# Patient Record
Sex: MALE | Race: OTHER | URBAN - METROPOLITAN AREA
[De-identification: names, ages, dates, MRNs, and addresses within clinical notes are randomized per-mention and may not be internally consistent; named-entity substitution may affect disease eponyms.]

---

## 2017-02-21 ENCOUNTER — OFFICE VISIT (OUTPATIENT)
Dept: FAMILY MEDICINE CLINIC | Age: 42
End: 2017-02-21

## 2017-02-21 VITALS
TEMPERATURE: 97.9 F | SYSTOLIC BLOOD PRESSURE: 136 MMHG | WEIGHT: 168.4 LBS | HEIGHT: 65 IN | HEART RATE: 57 BPM | DIASTOLIC BLOOD PRESSURE: 88 MMHG | BODY MASS INDEX: 28.06 KG/M2

## 2017-02-21 DIAGNOSIS — I10 ESSENTIAL HYPERTENSION, BENIGN: ICD-10-CM

## 2017-02-21 DIAGNOSIS — E89.0 HYPOTHYROIDISM, POSTRADIOIODINE THERAPY: Primary | ICD-10-CM

## 2017-02-21 DIAGNOSIS — E78.00 PURE HYPERCHOLESTEROLEMIA: ICD-10-CM

## 2017-02-21 RX ORDER — LISINOPRIL 5 MG/1
5 TABLET ORAL DAILY
Qty: 90 TAB | Refills: 0 | Status: SHIPPED | OUTPATIENT
Start: 2017-02-21 | End: 2017-04-17 | Stop reason: SDUPTHER

## 2017-02-21 RX ORDER — LEVOTHYROXINE SODIUM 200 UG/1
200 TABLET ORAL
Qty: 90 TAB | Refills: 0 | Status: SHIPPED | OUTPATIENT
Start: 2017-02-21 | End: 2017-04-17 | Stop reason: SDUPTHER

## 2017-02-21 RX ORDER — LOVASTATIN 10 MG/1
10 TABLET ORAL
Qty: 90 TAB | Refills: 0 | Status: SHIPPED | OUTPATIENT
Start: 2017-02-21 | End: 2017-04-17 | Stop reason: SDUPTHER

## 2017-02-21 NOTE — PROGRESS NOTES
Coordination of Care  1. Have you been to the ER, urgent care clinic since your last visit? Hospitalized since your last visit? No    2. Have you seen or consulted any other health care providers outside of the [facility] since your last visit? Include any pap smears or colon screening. No    Medications  Medication Reconciliation Performed: no  Patient does need refills     Learning Assessment Complete?  yes

## 2017-02-21 NOTE — PROGRESS NOTES
Assessment/Plan:       ICD-10-CM ICD-9-CM    1. Hypothyroidism, postradioiodine therapy E89.0 244.1 levothyroxine (SYNTHROID) 200 mcg tablet      T4, FREE      T3 TOTAL      TSH 3RD GENERATION   2. Essential hypertension, benign I10 401.1 lisinopril (PRINIVIL, ZESTRIL) 5 mg tablet   3. Pure hypercholesterolemia E78.00 272.0 lovastatin (MEVACOR) 10 mg tablet      LIPID PANEL     Follow-up Disposition:  Return for return 6 weeks fasting labs (ordered), follow up provider 7 or more weeks. [facility]  Subjective:   Maddie Williamson is a 37 y.o. OTHER male who speaks Bolivian. Chief Complaint   Patient presents with    Medication Refill     thyroid    -mouth is bitter, especially his throat and bad breath;   -he had graves' disease - hyperthyroid    Current Outpatient Prescriptions   Medication Sig Dispense Refill    levothyroxine (SYNTHROID) 200 mcg tablet Take 1 Tab by mouth Daily (before breakfast). Para el tiroides 90 Tab 0    lisinopril (PRINIVIL, ZESTRIL) 5 mg tablet Take 1 Tab by mouth daily. elver 1 tab por la boca diaria 90 Tab 0    lovastatin (MEVACOR) 10 mg tablet Take 1 Tab by mouth nightly. elver 1 tab por la boca diaria 90 Tab 0    ibuprofen (MOTRIN) 600 mg tablet Take 1 Tab by mouth every six (6) hours as needed for Pain. [place] 1 pastilla cada 6 horas si necissita por valle dolor con comida 60 Tab 1   History of Present Illness: Thyroid. He has been out of his medications for a week. denies fatigue, weight changes, heat/cold intolerance, bowel/skin changes or CVS symptoms  Past Surgical History: He  has no past surgical history on file. Social History: He  reports that he has never smoked. He has never used smokeless tobacco. He reports that he does not drink alcohol or use illicit drugs.   Objective:     Vitals:    02/21/17 0840   BP: 136/88   Pulse: (!) 57   Temp: 97.9 °F (36.6 °C)   TempSrc: Oral   Weight: 168 lb 6.4 oz (76.4 kg)   Height: 5' 5.35\" (1.66 m)    No LMP for male patient. Wt Readings from Last 2 Encounters:   02/21/17 168 lb 6.4 oz (76.4 kg)   12/01/16 170 lb (77.1 kg)     Lab Review:No results found for any visits on 02/21/17. Physical Examination: Neck - Supple, no adenopathy. Thyroid is normal in size without nodules or tenderness. General appearance - well developed, no acute distress. Chest - clear to auscultation. Heart - regular rate and rhythm without murmurs, rubs, or gallops. Abdomen - bowel sounds present x 4, NT, ND. Extremities - pulses intact. No peripheral edema. Assessment/Plan:   Andres Kent was seen today for medication refill. Diagnoses and all orders for this visit:    Hypothyroidism, postradioiodine therapy  -     levothyroxine (SYNTHROID) 200 mcg tablet; Take 1 Tab by mouth Daily (before breakfast). Para el tiroides  -     T4, FREE; Future  -     T3 TOTAL; Future  -     TSH 3RD GENERATION; Future    Essential hypertension, benign  -     lisinopril (PRINIVIL, ZESTRIL) 5 mg tablet; Take 1 Tab by mouth daily. elver 1 tab por la boca diaria    Pure hypercholesterolemia  -     lovastatin (MEVACOR) 10 mg tablet; Take 1 Tab by mouth nightly. elver 1 tab por la boca diaria  -     LIPID PANEL; Future      Follow-up Disposition:  Return for return 6 weeks fasting labs (ordered), follow up provider 7 or more weeks. Anna Lucero, MSN, RN, FNP-BC, BC-ADM  I sent 90 days; will have more after follow up appt  Return 6-8 weeks labs, then appt 2 mos - earliest to check would April 4 (6 weeks); plan at least 6 months of meds if all OK. Iwona Mead expressed understanding of this plan.

## 2017-04-06 ENCOUNTER — CLINICAL SUPPORT (OUTPATIENT)
Dept: FAMILY MEDICINE CLINIC | Age: 42
End: 2017-04-06

## 2017-04-06 ENCOUNTER — HOSPITAL ENCOUNTER (OUTPATIENT)
Dept: LAB | Age: 42
Discharge: HOME | End: 2017-04-06

## 2017-04-06 DIAGNOSIS — E78.00 PURE HYPERCHOLESTEROLEMIA: ICD-10-CM

## 2017-04-06 DIAGNOSIS — E89.0 HYPOTHYROIDISM, POSTRADIOIODINE THERAPY: ICD-10-CM

## 2017-04-06 DIAGNOSIS — Z13.29 SCREENING FOR THYROID DISORDER: Primary | ICD-10-CM

## 2017-04-06 LAB
CHOLEST SERPL-MCNC: 201 MG/DL
HDLC SERPL-MCNC: 75 MG/DL
HDLC SERPL: 2.7 {RATIO} (ref 0–5)
LDLC SERPL CALC-MCNC: 101.6 MG/DL (ref 0–100)
LIPID PROFILE,FLP: ABNORMAL
T4 FREE SERPL-MCNC: 1.2 NG/DL (ref 0.8–1.5)
TRIGL SERPL-MCNC: 122 MG/DL (ref ?–150)
TSH SERPL DL<=0.05 MIU/L-ACNC: 0.66 UIU/ML (ref 0.36–3.74)
VLDLC SERPL CALC-MCNC: 24.4 MG/DL

## 2017-04-06 PROCEDURE — 84439 ASSAY OF FREE THYROXINE: CPT | Performed by: NURSE PRACTITIONER

## 2017-04-06 PROCEDURE — 84480 ASSAY TRIIODOTHYRONINE (T3): CPT | Performed by: NURSE PRACTITIONER

## 2017-04-06 PROCEDURE — 80061 LIPID PANEL: CPT | Performed by: NURSE PRACTITIONER

## 2017-04-06 PROCEDURE — 84443 ASSAY THYROID STIM HORMONE: CPT | Performed by: NURSE PRACTITIONER

## 2017-04-06 NOTE — PROGRESS NOTES
Patient here for fasting labs only, labs drawn was a Lipid, Tsh,T3 Total, T4 Free, in the right arm, patient left lab with no bleeding, no pain, and feeling well. Patient was advise that a Dr or Nurse will call with the results or patient can go on my chart to see the results and then discuss at next visit. Tamica Syed, Medical Assistant.

## 2017-04-08 LAB — T3 SERPL-MCNC: 79 NG/DL (ref 71–180)

## 2017-04-17 ENCOUNTER — OFFICE VISIT (OUTPATIENT)
Dept: FAMILY MEDICINE CLINIC | Age: 42
End: 2017-04-17

## 2017-04-17 VITALS
TEMPERATURE: 98.7 F | SYSTOLIC BLOOD PRESSURE: 116 MMHG | WEIGHT: 171 LBS | BODY MASS INDEX: 28.15 KG/M2 | HEART RATE: 64 BPM | DIASTOLIC BLOOD PRESSURE: 78 MMHG

## 2017-04-17 DIAGNOSIS — E78.00 PURE HYPERCHOLESTEROLEMIA: ICD-10-CM

## 2017-04-17 DIAGNOSIS — I10 ESSENTIAL HYPERTENSION, BENIGN: ICD-10-CM

## 2017-04-17 DIAGNOSIS — E89.0 HYPOTHYROIDISM, POSTRADIOIODINE THERAPY: Primary | ICD-10-CM

## 2017-04-17 RX ORDER — LISINOPRIL 5 MG/1
5 TABLET ORAL DAILY
Qty: 90 TAB | Refills: 1 | Status: SHIPPED | OUTPATIENT
Start: 2017-04-17 | End: 2018-01-09 | Stop reason: SDUPTHER

## 2017-04-17 RX ORDER — LOVASTATIN 10 MG/1
10 TABLET ORAL
Qty: 90 TAB | Refills: 1 | Status: SHIPPED | OUTPATIENT
Start: 2017-04-17 | End: 2018-01-09 | Stop reason: SDUPTHER

## 2017-04-17 RX ORDER — LEVOTHYROXINE SODIUM 200 UG/1
200 TABLET ORAL
Qty: 90 TAB | Refills: 1 | Status: SHIPPED | OUTPATIENT
Start: 2017-04-17 | End: 2017-05-30 | Stop reason: SDUPTHER

## 2017-04-17 NOTE — PROGRESS NOTES
Assessment/Plan:       ICD-10-CM ICD-9-CM    1. Hypothyroidism, postradioiodine therapy E89.0 244.1 levothyroxine (SYNTHROID) 200 mcg tablet   2. Essential hypertension, benign I10 401.1 lisinopril (PRINIVIL, ZESTRIL) 5 mg tablet   3. Pure hypercholesterolemia E78.00 272.0 lovastatin (MEVACOR) 10 mg tablet     Follow-up Disposition:  Return in about 6 months (around 10/17/2017). [facility]  Subjective:   Jose Otero is a 37 y.o. OTHER male who speaks Icelandic. Chief Complaint   Patient presents with    Hypertension     f/u   History of Present Illness: feeling well. He had his labs done as asked. Review of Systems: Negative for: fever, chest pain, shortness of breath, leg swelling, exertional dyspnea, palpitations. Past Surgical History: He  has no past surgical history on file. Social History: He  reports that he has never smoked. He has never used smokeless tobacco. He reports that he does not drink alcohol or use illicit drugs. Objective:     Vitals:    04/17/17 1304   BP: 116/78   Pulse: 64   Temp: 98.7 °F (37.1 °C)   TempSrc: Oral   Weight: 171 lb (77.6 kg)    No LMP for male patient. Wt Readings from Last 2 Encounters:   04/17/17 171 lb (77.6 kg)   02/21/17 168 lb 6.4 oz (76.4 kg)     Hospital Outpatient Visit on 04/06/2017   Component Date Value Ref Range Status    T4, Free 04/06/2017 1.2  0.8 - 1.5 NG/DL Final    T3, total 04/06/2017 79  71 - 180 ng/dL Final    Comment: (NOTE)  Performed At: [address] [id number]  Herminio Medina MD TY:[id number]      TSH 04/06/2017 0.66  0.36 - 3.74 uIU/mL Final    Comment: (NOTE)  Due to TSH heterogeneity, both structurally and degree of   glycosylation, monoclonal antibodies used in the TSH assay may not   accurately quantitate TSH.  Therefore, this result should be   correlated with clinical findings as well as with other assessments   of thyroid function, e.g., free T4, free T3.      LIPID PROFILE 04/06/2017        Final    Cholesterol, total 04/06/2017 201* <200 MG/DL Final    Triglyceride 04/06/2017 122  <150 MG/DL Final    Based on NCEP-ATP III:  Triglycerides <150 mg/dL  is considered normal, 150-199 mg/dL  borderline high,  200-499 mg/dL high and  greater than or equal to 500 mg/dL very high.  HDL Cholesterol 04/06/2017 75  MG/DL Final    Based on NCEP ATP III, HDL Cholesterol <40 mg/dL is considered low and >60 mg/dL is elevated.  LDL, calculated 04/06/2017 101.6* 0 - 100 MG/DL Final    Comment: Based on the NCEP-ATP: LDL-C concentrations are considered  optimal <100 mg/dL,  near optimal/above Normal 100-129 mg/dL  Borderline High: 130-159, High: 160-189 mg/dL  Very High: Greater than or equal to 190 mg/dL      VLDL, calculated 04/06/2017 24.4  MG/DL Final    CHOL/HDL Ratio 04/06/2017 2.7  0 - 5.0   Final       Lab Review:No results found for any visits on 04/17/17. Physical Examination:   General appearance - well developed, no acute distress. Neck supple, no lymphadenopathy, no thyromegaly or nodules. Chest - clear to auscultation. Heart - regular rate and rhythm without murmurs, rubs, or gallops. Abdomen - bowel sounds present x 4, NT, ND. Extremities - pulses intact. No peripheral edema. Assessment/Plan:   Anant Mendez was seen today for hypertension. Diagnoses and all orders for this visit:    Hypothyroidism, postradioiodine therapy  -     levothyroxine (SYNTHROID) 200 mcg tablet; Take 1 Tab by mouth Daily (before breakfast). Para el tiroides    Essential hypertension, benign  -     lisinopril (PRINIVIL, ZESTRIL) 5 mg tablet; Take 1 Tab by mouth daily. elver 1 tab por la boca diaria    Pure hypercholesterolemia  -     lovastatin (MEVACOR) 10 mg tablet; Take 1 Tab by mouth nightly. elver 1 tab por la boca diaria      Follow-up Disposition:  Return in about 6 months (around 10/17/2017). Labs look great. Continue current medications.   Abiodun Cesar, MSN, RN, FNP-BC, BC-ADM  Patrica Ledesma expressed understanding of this plan.

## 2017-04-17 NOTE — PROGRESS NOTES
Coordination of Care  1. Have you been to the ER, urgent care clinic since your last visit? Hospitalized since your last visit? No    2. Have you seen or consulted any other health care providers outside of the [address] [place] [address] since your last visit? Include any pap smears or colon screening. No    Medications  Medication Reconciliation Performed: no  Patient does need refills     Learning Assessment Complete?  yes

## 2017-05-26 DIAGNOSIS — E89.0 HYPOTHYROIDISM, POSTRADIOIODINE THERAPY: ICD-10-CM

## 2017-05-30 RX ORDER — LEVOTHYROXINE SODIUM 200 UG/1
TABLET ORAL
Qty: 90 TAB | Refills: 0 | Status: SHIPPED | OUTPATIENT
Start: 2017-05-30 | End: 2018-01-09 | Stop reason: SDUPTHER

## 2018-01-09 ENCOUNTER — OFFICE VISIT (OUTPATIENT)
Dept: FAMILY MEDICINE CLINIC | Age: 43
End: 2018-01-09

## 2018-01-09 VITALS
OXYGEN SATURATION: 100 % | WEIGHT: 174 LBS | BODY MASS INDEX: 28.99 KG/M2 | TEMPERATURE: 97.5 F | SYSTOLIC BLOOD PRESSURE: 140 MMHG | HEIGHT: 65 IN | HEART RATE: 54 BPM | DIASTOLIC BLOOD PRESSURE: 102 MMHG

## 2018-01-09 DIAGNOSIS — E78.00 PURE HYPERCHOLESTEROLEMIA: ICD-10-CM

## 2018-01-09 DIAGNOSIS — T14.8XXA MUSCLE STRAIN: ICD-10-CM

## 2018-01-09 DIAGNOSIS — I10 ESSENTIAL HYPERTENSION, BENIGN: ICD-10-CM

## 2018-01-09 DIAGNOSIS — Z23 ENCOUNTER FOR IMMUNIZATION: ICD-10-CM

## 2018-01-09 DIAGNOSIS — E89.0 HYPOTHYROIDISM, POSTRADIOIODINE THERAPY: Primary | ICD-10-CM

## 2018-01-09 RX ORDER — LEVOTHYROXINE SODIUM 200 UG/1
TABLET ORAL
Qty: 90 TAB | Refills: 3 | Status: SHIPPED | OUTPATIENT
Start: 2018-01-09 | End: 2018-12-12 | Stop reason: SDUPTHER

## 2018-01-09 RX ORDER — LISINOPRIL 10 MG/1
10 TABLET ORAL DAILY
Qty: 90 TAB | Refills: 3 | Status: SHIPPED | OUTPATIENT
Start: 2018-01-09 | End: 2018-12-04 | Stop reason: SDUPTHER

## 2018-01-09 RX ORDER — LOVASTATIN 10 MG/1
10 TABLET ORAL
Qty: 90 TAB | Refills: 3 | Status: SHIPPED | OUTPATIENT
Start: 2018-01-09 | End: 2018-04-27 | Stop reason: SDUPTHER

## 2018-01-09 RX ORDER — IBUPROFEN 600 MG/1
600 TABLET ORAL
Qty: 60 TAB | Refills: 1 | Status: SHIPPED | OUTPATIENT
Start: 2018-01-09

## 2018-01-09 NOTE — PROGRESS NOTES
Kristine Muller, MSN, RN, FNP-BC, BC-ADM  [place] [facility]  Subjective:      Roland Fam is an 40 y.o. male who presents for hypothyroidism follow up. Chief Complaint   Patient presents with    Medication Refill     for HTN, Cholesterol and thryroid    Other     pain in left elbow, when he closes his left hand 7/10 pain    Immunization/Injection     He brought his medications in today. yes  Lavena Saint has run out of medications . no    for 2 months, brachioradialis muscle pain, no injury. Outpatient Medications Prior to Visit   Medication Sig Dispense Refill    levothyroxine (SYNTHROID) 200 mcg tablet TAKE ONE TABLET BY MOUTH BEFORE BREAKFAST 90 Tab 0    lisinopril (PRINIVIL, ZESTRIL) 5 mg tablet Take 1 Tab by mouth daily. elver 1 tab por la boca diaria 90 Tab 1    lovastatin (MEVACOR) 10 mg tablet Take 1 Tab by mouth nightly. elver 1 tab por la boca diaria 90 Tab 1    ibuprofen (MOTRIN) 600 mg tablet Take 1 Tab by mouth every six (6) hours as needed for Pain. [place] 1 pastilla cada 6 horas si necissita por valle dolor con comida 60 Tab 1     No facility-administered medications prior to visit. Thyroid  He denies fatigue, weight changes, heat/cold intolerance, bowel/skin changes or cardiovascular symptoms      Objective:     Visit Vitals    BP (!) 140/102 (BP 1 Location: Right arm, BP Patient Position: Sitting)    Pulse (!) 54    Temp 97.5 °F (36.4 °C) (Oral)    Ht 5' 5.16\" (1.655 m)    Wt 174 lb (78.9 kg)    SpO2 100%    BMI 28.82 kg/m2    No LMP for male patient. Eyes: no proptosis. Lungs: CTAB. Heart: RRR, no MRG. Neck: thyroid is normal in size without nodules or tenderness. Musculoskeletal: tenderness of brachioradialis noted with flexion and extension of fingers of left hand. Laboratory: Labs reviewed and discussed with the patient.   Lab Results   Component Value Date/Time    TSH 0.66 04/06/2017 12:57 PM    T4, Free 1.2 04/06/2017 12:57 PM         Assessment/Plan: Diagnoses and all orders for this visit:    1. Hypothyroidism, postradioiodine therapy  -     levothyroxine (SYNTHROID) 200 mcg tablet; TAKE ONE TABLET BY MOUTH BEFORE BREAKFAST; Iranian sig    2. Essential hypertension, benign  -     lisinopril (PRINIVIL, ZESTRIL) 10 mg tablet; Take 1 Tab by mouth daily. elver 1 tab por la boca diaria    3. Pure hypercholesterolemia  -     lovastatin (MEVACOR) 10 mg tablet; Take 1 Tab by mouth nightly. elver 1 tab por la boca diaria    4. Encounter for immunization  -     Influenza virus vaccine (QUADRIVALENT PRES FREE SYRINGE) IM (01383)    5. Muscle strain  -     ibuprofen (MOTRIN) 600 mg tablet; Take 1 Tab by mouth every six (6) hours as needed for Pain.  [place] 1 pastilla cada 6 horas si necissita por valle dolor con comida

## 2018-01-09 NOTE — PROGRESS NOTES
Coordination of Care  1. Have you been to the ER, urgent care clinic since your last visit? Hospitalized since your last visit? No    2. Have you seen or consulted any other health care providers outside of the [address] [place] [address] since your last visit? Include any pap smears or colon screening. No    Medications  Does the patient need refills? YES    Learning Assessment Complete?  yes

## 2018-01-09 NOTE — PROGRESS NOTES
Gave vaccine per protocol. Documented in [address]. Gave patient VIS information sheet and reviewed instructions regarding possible adverse side effects and allergic reactions. No adverse reaction noted at time of discharge.  Shaila Whitfield RN

## 2018-04-27 DIAGNOSIS — E78.00 PURE HYPERCHOLESTEROLEMIA: ICD-10-CM

## 2018-04-27 RX ORDER — LOVASTATIN 10 MG/1
TABLET ORAL
Qty: 90 TAB | Refills: 1 | Status: SHIPPED | OUTPATIENT
Start: 2018-04-27 | End: 2018-12-04 | Stop reason: SDUPTHER

## 2018-12-04 ENCOUNTER — HOSPITAL ENCOUNTER (OUTPATIENT)
Dept: LAB | Age: 43
Discharge: HOME | End: 2018-12-04

## 2018-12-04 ENCOUNTER — OFFICE VISIT (OUTPATIENT)
Dept: FAMILY MEDICINE CLINIC | Age: 43
End: 2018-12-04

## 2018-12-04 VITALS
TEMPERATURE: 98.1 F | HEART RATE: 62 BPM | HEIGHT: 65 IN | BODY MASS INDEX: 29.39 KG/M2 | SYSTOLIC BLOOD PRESSURE: 135 MMHG | WEIGHT: 176.4 LBS | DIASTOLIC BLOOD PRESSURE: 82 MMHG

## 2018-12-04 DIAGNOSIS — I10 ESSENTIAL HYPERTENSION, BENIGN: ICD-10-CM

## 2018-12-04 DIAGNOSIS — E78.00 PURE HYPERCHOLESTEROLEMIA: ICD-10-CM

## 2018-12-04 DIAGNOSIS — Z23 ENCOUNTER FOR IMMUNIZATION: ICD-10-CM

## 2018-12-04 DIAGNOSIS — E03.9 ACQUIRED HYPOTHYROIDISM: Primary | ICD-10-CM

## 2018-12-04 DIAGNOSIS — E03.9 ACQUIRED HYPOTHYROIDISM: ICD-10-CM

## 2018-12-04 LAB
ANION GAP SERPL CALC-SCNC: 6 MMOL/L (ref 5–15)
BUN SERPL-MCNC: 16 MG/DL (ref 6–20)
BUN/CREAT SERPL: 15 (ref 12–20)
CALCIUM SERPL-MCNC: 8.7 MG/DL (ref 8.5–10.1)
CHLORIDE SERPL-SCNC: 102 MMOL/L (ref 97–108)
CHOLEST SERPL-MCNC: 248 MG/DL
CO2 SERPL-SCNC: 29 MMOL/L (ref 21–32)
CREAT SERPL-MCNC: 1.09 MG/DL (ref 0.7–1.3)
GLUCOSE SERPL-MCNC: 83 MG/DL (ref 65–100)
HDLC SERPL-MCNC: 71 MG/DL
HDLC SERPL: 3.5 {RATIO} (ref 0–5)
LDLC SERPL CALC-MCNC: 155.8 MG/DL (ref 0–100)
LIPID PROFILE,FLP: ABNORMAL
POTASSIUM SERPL-SCNC: 3.7 MMOL/L (ref 3.5–5.1)
SODIUM SERPL-SCNC: 137 MMOL/L (ref 136–145)
TRIGL SERPL-MCNC: 106 MG/DL (ref ?–150)
TSH SERPL DL<=0.05 MIU/L-ACNC: 46.2 UIU/ML (ref 0.36–3.74)
VLDLC SERPL CALC-MCNC: 21.2 MG/DL

## 2018-12-04 PROCEDURE — 84443 ASSAY THYROID STIM HORMONE: CPT

## 2018-12-04 PROCEDURE — 80048 BASIC METABOLIC PNL TOTAL CA: CPT

## 2018-12-04 PROCEDURE — 80061 LIPID PANEL: CPT

## 2018-12-04 RX ORDER — LISINOPRIL 10 MG/1
10 TABLET ORAL DAILY
Qty: 90 TAB | Refills: 3 | Status: SHIPPED | OUTPATIENT
Start: 2018-12-04

## 2018-12-04 RX ORDER — LOVASTATIN 10 MG/1
TABLET ORAL
Qty: 90 TAB | Refills: 3 | Status: SHIPPED | OUTPATIENT
Start: 2018-12-04

## 2018-12-04 NOTE — PROGRESS NOTES
Idnira Reza  Requests flu vaccine. Denies fever and egg allergy. VIS information sheet given to patient. Explained possible s/e. Reviewed s/sx indicating need to be seen in ER. Patient had no adverse reaction at time of discharge. Entered into VIIS. GIVEN BY LEO CHO, [facility], EMILY.  Franny Vickers RN

## 2018-12-04 NOTE — PROGRESS NOTES
Printed AVS, provided to pt and reviewed. Pt indicated understanding and had no questions. Provided pt with eyecare resources.  Corky Warner RN

## 2018-12-04 NOTE — PROGRESS NOTES
Coordination of Care 1. Have you been to the ER, urgent care clinic since your last visit? Hospitalized since your last visit? No 
 
2. Have you seen or consulted any other health care providers outside of the [address] [place] [address] since your last visit? Include any pap smears or colon screening. No 
 
Does the patient need refills? YES Learning Assessment Complete? yes Depression Screening complete in the past 12 months? yes

## 2018-12-04 NOTE — PROGRESS NOTES
Indira Reza is a 40 y.o. male Issues discussed today include: Chief Complaint Patient presents with  Medication Refill  
  thyroid  Hypertension  
  refills needed 1) HTN, HLD, hypothyroidism:  Here for follow up, came early to make the line. Has not been seen for > 10 months. Just ran out of his 3 chronic medications ~ 1 week ago. Reports radiation to his thyroid ~ 2008/2009 at [facility]. Has been on thyroid medication since. Denies SEs of the medication. No dry cough, tongue/lip swelling. Denies fatigue, tremors, constipation, diarrhea, palpitations. No neck pain, swelling, dysphagia. Denies HA, dizziness, CP, SOB, palpitations. Has blurry vision, but needs to see eye doctor. Data reviewed or ordered today:    
 
Other problems include: 
Patient Active Problem List  
Diagnosis Code  Essential hypertension, benign I10  
 Hyperlipidemia E78.5  Hypothyroidism, postradioiodine therapy E89.0  Graves disease E05.00 Medications: 
Current Outpatient Medications Medication Sig Dispense Refill  lisinopril (PRINIVIL, ZESTRIL) 10 mg tablet Take 1 Tab by mouth daily. [place] 1 tab por la boca diaria 90 Tab 3  
 lovastatin (MEVACOR) 10 mg tablet TAKE ONE TABLET BY MOUTH ONCE DAILY IN THE EVENING. [place] 1 tableta cada noche 90 Tab 3  
 levothyroxine (SYNTHROID) 200 mcg tablet TAKE ONE TABLET BY MOUTH BEFORE BREAKFAST; Lithuanian sig 90 Tab 3  ibuprofen (MOTRIN) 600 mg tablet Take 1 Tab by mouth every six (6) hours as needed for Pain. [place] 1 pastilla cada 6 horas si necissita por valle dolor con comida 60 Tab 1 Allergies: 
No Known Allergies LMP:  No LMP for male patient. Social History Socioeconomic History  Marital status: SINGLE Spouse name: Not on file  Number of children: Not on file  Years of education: Not on file  Highest education level: Not on file Social Needs  Financial resource strain: Not on file  Food insecurity - worry: Not on file  Food insecurity - inability: Not on file  Transportation needs - medical: Not on file  Transportation needs - non-medical: Not on file Occupational History  Not on file Tobacco Use  Smoking status: Never Smoker  Smokeless tobacco: Never Used Substance and Sexual Activity  Alcohol use: Yes Alcohol/week: 0.0 oz Frequency: Monthly or less Drinks per session: 1 or 2 Binge frequency: Less than monthly Comment: Quite 6 months ago  Drug use: No  
 Sexual activity: Not on file Other Topics Concern  Not on file Social History Narrative  Not on file Family History Problem Relation Age of Onset  Hypertension Neg Hx  Diabetes Neg Hx Physical Exam  
Visit Vitals /82 (BP 1 Location: Right arm, BP Patient Position: Sitting) Pulse 62 Temp 98.1 °F (36.7 °C) (Oral) Ht 5' 5.12\" (1.654 m) Wt 176 lb 6.4 oz (80 kg) BMI 29.25 kg/m² BP Readings from Last 3 Encounters:  
12/04/18 135/82  
01/09/18 (!) 140/102  
04/17/17 116/78 Constitutional: Appears well,  No acute distress, Vitals noted Psychiatric:  Affect normal, Alert and Oriented to person/place/time Eyes:  Conjunctiva clear, no drainage ENT:  External ears and nose normal, Mucous membranes moist 
Neck:  General inspection normal. Supple. Thyroid normal on palpation without nodules, enlargement nor tenderness. Heart:  Normal HR, Normal S1 and S2,  Regular rhythm. No murmurs, rubs or gallops. Lungs:  Clear to auscultation, good respiratory effort, no wheezes, rales or rhonchi Skin:  Warm to palpation, without rashes Lab Results Component Value Date/Time TSH 0.66 04/06/2017 12:57 PM  
 
Lab Results Component Value Date/Time  Cholesterol, total 201 (H) 04/06/2017 12:57 PM  
 HDL Cholesterol 75 04/06/2017 12:57 PM  
 LDL, calculated 101.6 (H) 04/06/2017 12:57 PM  
 VLDL, calculated 24.4 04/06/2017 12:57 PM  
 Triglyceride 122 04/06/2017 12:57 PM  
 CHOL/HDL Ratio 2.7 04/06/2017 12:57 PM  
 
Lab Results Component Value Date/Time Sodium 136 01/12/2016 09:24 AM  
 Potassium 4.3 01/12/2016 09:24 AM  
 Chloride 99 01/12/2016 09:24 AM  
 CO2 29 01/12/2016 09:24 AM  
 Anion gap 8 01/12/2016 09:24 AM  
 Glucose 85 01/12/2016 09:24 AM  
 BUN 15 01/12/2016 09:24 AM  
 Creatinine 1.01 01/12/2016 09:24 AM  
 BUN/Creatinine ratio 15 01/12/2016 09:24 AM  
 GFR est AA >60 01/12/2016 09:24 AM  
 GFR est non-AA >60 01/12/2016 09:24 AM  
 Calcium 8.8 01/12/2016 09:24 AM  
 
 
 
Assessment/Plan: ICD-10-CM ICD-9-CM 1. Acquired hypothyroidism E03.9 244.9 TSH 3RD GENERATION 2. Essential hypertension, benign W54 548.6 METABOLIC PANEL, BASIC  
   lisinopril (PRINIVIL, ZESTRIL) 10 mg tablet 3. Pure hypercholesterolemia E78.00 272.0 LIPID PANEL  
   lovastatin (MEVACOR) 10 mg tablet HTN, well controlled, cont lisinopril 10mg daily, refill sent HLD, last lipid panel 4/2017, checking FLP today and refilled lovastatin Hypothyroidism 2/2 radiation for presumed preceding hyperthyroidism (but no records on file that I can find), TSH very high and free T4 low in 2009. Checking TSH today and will refill levothyroxine at adjusted vs same dose based on result - pt knows med will be available tomorrow midday Eye resource page given by nurse Wants flu vaccine today Follow-up Disposition: 
Return for 4-6 months HTN, Thyroid f/u appt. Leandra Paul MD 
[address] [place] [address] [employer]

## 2018-12-05 NOTE — PROGRESS NOTES
TSH is very high indicating his thyroid hormone level is low. On chart review, pt has been on same levothyroxine dose 200mcg daily for years with stable TSH.   ** I wonder if he might have been out of his medicine longer than just one week. If that is the case, I will just resume 200mcg daily  ** If he confirms he has only missed his levothyroxine for 1 week, then I will increase his dose to 225mcg daily (200mcg tab + 25mcg tab daily)    I tried calling him, but no answer and  no set up. Will try again and route to CBN in case he calls the clinic.

## 2018-12-12 DIAGNOSIS — E89.0 HYPOTHYROIDISM, POSTRADIOIODINE THERAPY: ICD-10-CM

## 2018-12-12 RX ORDER — LEVOTHYROXINE SODIUM 200 UG/1
TABLET ORAL
Qty: 90 TAB | Refills: 3 | Status: SHIPPED | OUTPATIENT
Start: 2018-12-12

## 2018-12-15 DIAGNOSIS — E03.9 ACQUIRED HYPOTHYROIDISM: Primary | ICD-10-CM

## 2018-12-15 NOTE — PROGRESS NOTES
I called pt again and reached him this am  He says he picked up his medicine and is taking it  He denies symptoms or concerns, says it was possible he was out of meds for 2-3 weeks so we will keep his dose the same 200mcg daily  I encouraged him to take it daily and advised him to come for labs in 6-8 weeks to check his level  I have ordered TSH and will send staff msg to schedulers to make lab appt   I also reviewed his BMP and lipid panel results, continue lipitor for now

## 2019-01-29 ENCOUNTER — LAB ONLY (OUTPATIENT)
Dept: FAMILY MEDICINE CLINIC | Age: 44
End: 2019-01-29

## 2019-01-29 ENCOUNTER — HOSPITAL ENCOUNTER (OUTPATIENT)
Dept: LAB | Age: 44
Discharge: HOME | End: 2019-01-29

## 2019-01-29 DIAGNOSIS — E03.9 ACQUIRED HYPOTHYROIDISM: ICD-10-CM

## 2019-01-29 PROCEDURE — 84443 ASSAY THYROID STIM HORMONE: CPT

## 2019-01-30 LAB — TSH SERPL DL<=0.05 MIU/L-ACNC: 0.32 UIU/ML (ref 0.36–3.74)

## 2019-01-30 NOTE — PROGRESS NOTES
TSH now low in current dose  ** recommend he take 200mcg daily on 6 days of the week and once weekly (eg every Friday) tale 100mcg (1/2 tab)   ** return for labs in 6-8 weeks to recheck TSH

## 2019-02-22 NOTE — PROGRESS NOTES
Result note and recommendations from Dr. Grecia Rodriguez given to patient over the phone. Did not give patient lab appt. At this time due to patient has a follow up appt. With the DrTyra In 6 weeks on 4/2/19, and patient can get blood drawn at this time if needed. This has been fully explained to the patient, who indicates understanding. No further questions from patient.

## 2019-04-02 ENCOUNTER — HOSPITAL ENCOUNTER (OUTPATIENT)
Dept: LAB | Age: 44
Discharge: HOME | End: 2019-04-02

## 2019-04-02 ENCOUNTER — OFFICE VISIT (OUTPATIENT)
Dept: FAMILY MEDICINE CLINIC | Age: 44
End: 2019-04-02

## 2019-04-02 VITALS
SYSTOLIC BLOOD PRESSURE: 124 MMHG | WEIGHT: 178 LBS | TEMPERATURE: 97.8 F | HEART RATE: 66 BPM | DIASTOLIC BLOOD PRESSURE: 81 MMHG | BODY MASS INDEX: 29.51 KG/M2

## 2019-04-02 DIAGNOSIS — E03.9 ACQUIRED HYPOTHYROIDISM: Primary | ICD-10-CM

## 2019-04-02 DIAGNOSIS — E78.00 PURE HYPERCHOLESTEROLEMIA: ICD-10-CM

## 2019-04-02 DIAGNOSIS — E03.9 ACQUIRED HYPOTHYROIDISM: ICD-10-CM

## 2019-04-02 DIAGNOSIS — I10 ESSENTIAL HYPERTENSION, BENIGN: ICD-10-CM

## 2019-04-02 LAB — TSH SERPL DL<=0.05 MIU/L-ACNC: 1.38 UIU/ML (ref 0.36–3.74)

## 2019-04-02 PROCEDURE — 84443 ASSAY THYROID STIM HORMONE: CPT

## 2019-04-02 NOTE — PROGRESS NOTES
Isela Gomez is a 44 y.o. male    Issues discussed today include:    Chief Complaint   Patient presents with    Hypertension    Thyroid Problem       1) Hypothyroidism:  Has been taking levothyroxine 200mcg dialy before breakfast and 100mcg every Sat. Feels tired off and on, no more than usual, but thinks from work. Diarrhea at times, not often. No constipation. Denies palpitations, skin changes. 2) HTN:  Is taking lisinopril 10mg daily. Is not checking BP outside clinic. Says he follows low fat diet in general, eats mostly home cooked foods. No etoh use \"in many years. 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 3) HLD:  Is taking mevacor without SEs. Denies myalgias, weakness. Following healthy diet. No tobacco use. Also with HTN. Data reviewed or ordered today:       Other problems include:  Patient Active Problem List   Diagnosis Code    Essential hypertension, benign I10    Hyperlipidemia E78.5    Hypothyroidism, postradioiodine therapy E89.0    Graves disease E05.00       Medications:  Current Outpatient Medications   Medication Sig Dispense Refill    levothyroxine (SYNTHROID) 200 mcg tablet TAKE ONE TABLET BY MOUTH BEFORE BREAKFAST; [place] sig 90 Tab 3    lisinopril (PRINIVIL, ZESTRIL) 10 mg tablet Take 1 Tab by mouth daily. nadia 1 tab por la boca diaria 90 Tab 3    lovastatin (MEVACOR) 10 mg tablet TAKE ONE TABLET BY MOUTH ONCE DAILY IN THE EVENING. Nadia 1 tableta cada noche 90 Tab 3    ibuprofen (MOTRIN) 600 mg tablet Take 1 Tab by mouth every six (6) hours as needed for Pain. [place] 1 pastilla cada 6 horas si necissita por valle dolor con comida 60 Tab 1       Allergies:  No Known Allergies    LMP:  No LMP for male patient.     Social History     Socioeconomic History    Marital status: SINGLE     Spouse name: Not on file    Number of children: Not on file    Years of education: Not on file    Highest education level: Not on file Occupational History    Not on file   Social Needs    Financial resource strain: Not on file    Food insecurity:     Worry: Not on file     Inability: Not on file    Transportation needs:     Medical: Not on file     Non-medical: Not on file   Tobacco Use    Smoking status: Never Smoker    Smokeless tobacco: Never Used   Substance and Sexual Activity    Alcohol use:  Yes     Alcohol/week: 0.0 oz     Frequency: Monthly or less     Drinks per session: 1 or 2     Binge frequency: Less than monthly     Comment: Quite 6 months ago    Drug use: No    Sexual activity: Not on file   Lifestyle    Physical activity:     Days per week: Not on file     Minutes per session: Not on file    Stress: Not on file   Relationships    Social connections:     Talks on phone: Not on file     Gets together: Not on file     Attends Oriental orthodox service: Not on file     Active member of club or organization: Not on file     Attends meetings of clubs or organizations: Not on file     Relationship status: Not on file    Intimate partner violence:     Fear of current or ex partner: Not on file     Emotionally abused: Not on file     Physically abused: Not on file     Forced sexual activity: Not on file   Other Topics Concern    Not on file   Social History Narrative    Not on file       Family History   Problem Relation Age of Onset    Hypertension Neg Hx     Diabetes Neg Hx          Physical Exam   Visit Vitals  /81 (BP 1 Location: Left arm, BP Patient Position: Sitting)   Pulse 66   Temp 97.8 °F (36.6 °C) (Oral)   Wt 178 lb (80.7 kg)   BMI 29.51 kg/m²      BP Readings from Last 3 Encounters:   04/02/19 124/81   12/04/18 135/82   01/09/18 (!) 140/102     Constitutional: Appears well,  No acute distress, Vitals noted  Psychiatric:  Affect normal, Alert and Oriented to person/place/time  Eyes:  Conjunctiva clear, no drainage  ENT:  External ears and nose normal, Mucous membranes moist  Neck:  General inspection normal. Supple. Thyroid normal on palpation without nodules, enlargement nor tenderness. Heart:  Normal HR, Normal S1 and S2,  Regular rhythm. No murmurs, rubs or gallops. Lungs:  Clear to auscultation, good respiratory effort, no wheezes, rales or rhonchi  Extremities: Without edema, good peripheral pulses  Skin:  Warm to palpation, without rashes      Lab Results   Component Value Date/Time    Sodium 137 12/04/2018 10:26 AM    Potassium 3.7 12/04/2018 10:26 AM    Chloride 102 12/04/2018 10:26 AM    CO2 29 12/04/2018 10:26 AM    Anion gap 6 12/04/2018 10:26 AM    Glucose 83 12/04/2018 10:26 AM    BUN 16 12/04/2018 10:26 AM    Creatinine 1.09 12/04/2018 10:26 AM    BUN/Creatinine ratio 15 12/04/2018 10:26 AM    GFR est AA >60 12/04/2018 10:26 AM    GFR est non-AA >60 12/04/2018 10:26 AM    Calcium 8.7 12/04/2018 10:26 AM     Lab Results   Component Value Date/Time    Cholesterol, total 248 (H) 12/04/2018 10:26 AM    HDL Cholesterol 71 12/04/2018 10:26 AM    LDL, calculated 155.8 (H) 12/04/2018 10:26 AM    VLDL, calculated 21.2 12/04/2018 10:26 AM    Triglyceride 106 12/04/2018 10:26 AM    CHOL/HDL Ratio 3.5 12/04/2018 10:26 AM         Assessment/Plan:      ICD-10-CM ICD-9-CM    1. Acquired hypothyroidism E03.9 244.9 TSH 3RD GENERATION   2. Essential hypertension, benign I10 401.1    3. Pure hypercholesterolemia E78.00 272.0        Chronic conditions well controlled in the past. BP wnl. Last TSH Jan 2019 required some l-thyroxine adjustment. Recheck TSH today and will adjust med prn  Continue current regimen, no refills neded    Follow-up and Dispositions    · Return in about 4 months (around 8/2/2019) for HTN, thyroid, cholesterol f/u appt.        Joseline Chen MD  [address] [place] [address] Insurance

## 2019-04-02 NOTE — PROGRESS NOTES
Coordination of Care  1. Have you been to the ER, urgent care clinic since your last visit? Hospitalized since your last visit? No    2. Have you seen or consulted any other health care providers outside of the [address] [place] [address] since your last visit? Include any pap smears or colon screening. No    Does the patient need refills? YES    Learning Assessment Complete?  yes

## 2019-04-03 NOTE — PROGRESS NOTES
TSH wnl, continue levothyroxine at 200mcg qam except 1/2 tab (100mcg) once weekly Saturdays.  Pt has refills on med and knows to continue same tx or call to know results